# Patient Record
Sex: MALE | HISPANIC OR LATINO | URBAN - METROPOLITAN AREA
[De-identification: names, ages, dates, MRNs, and addresses within clinical notes are randomized per-mention and may not be internally consistent; named-entity substitution may affect disease eponyms.]

---

## 2019-07-06 ENCOUNTER — EMERGENCY (EMERGENCY)
Facility: HOSPITAL | Age: 39
LOS: 1 days | Discharge: HOME | End: 2019-07-06
Attending: EMERGENCY MEDICINE
Payer: COMMERCIAL

## 2019-07-06 VITALS
OXYGEN SATURATION: 98 % | SYSTOLIC BLOOD PRESSURE: 145 MMHG | HEART RATE: 50 BPM | DIASTOLIC BLOOD PRESSURE: 78 MMHG | RESPIRATION RATE: 18 BRPM

## 2019-07-06 VITALS
SYSTOLIC BLOOD PRESSURE: 142 MMHG | TEMPERATURE: 98 F | RESPIRATION RATE: 20 BRPM | HEIGHT: 71 IN | OXYGEN SATURATION: 98 % | HEART RATE: 60 BPM | DIASTOLIC BLOOD PRESSURE: 74 MMHG | WEIGHT: 173.94 LBS

## 2019-07-06 PROCEDURE — 99282 EMERGENCY DEPT VISIT SF MDM: CPT

## 2019-07-06 PROCEDURE — 99283 EMERGENCY DEPT VISIT LOW MDM: CPT

## 2019-07-06 NOTE — ED PROVIDER NOTE - PROGRESS NOTE DETAILS
Pt tolerated po, no nausea/vomiting or abd pain. has improved symptoms, would like to go home, will f.u with pmd tomorrow, refusing zofran for home -Mis Ireland PA-C

## 2019-07-06 NOTE — ED PROVIDER NOTE - PHYSICAL EXAMINATION
A&Ox3, NAD. NCAT. PERRL, EOMI. Neck supple, no LAD. Lungs CTAB. +S1S2, RRR, No m/r/g. Abd soft, NT/ND, +BS, no rebound or guarding, murphys negative. Extremities: cap refill <2, pulses in distal extremities 4+, no edema. Skin without rash. CN II-XII intact. Strength 5/5 UE/LE. Sensations intact throughout. Gait steady.

## 2019-07-06 NOTE — ED PROVIDER NOTE - OBJECTIVE STATEMENT
40 yo male with no pmh, no surgical hx complaining of sudden onset of diffuse abdominal pain/bloating and nausea starting 3-4 hrs ago while grocery shopping. he states pain started suddenly after having protein shake, was very uncomfortable took gas-ex without relief and came for evaluation. He states abd pain and nausea has since mostly resolved. He has been previously feeling well prior to symptom onset, no fevers, chills, vomiting, diarrhea, constipation, sick contacts or recent travel, no dysuria.

## 2019-07-06 NOTE — ED PROVIDER NOTE - NS ED ROS FT
Constitutional: No fever or chills  Eyes: No visual changes, eye pain or redness  HEENT: No throat pain, ear pain, nasal pain. No nose bleeding.  CV: No chest pain or lower extremity edema  Resp: No SOB no cough  GI: see hpi  : No dysuria, hematuria.   MSK: No musculoskeletal pain  Skin: No rash  Neuro: No headache. No numbness or tingling. No weakness.

## 2019-07-06 NOTE — ED PROVIDER NOTE - PLAN OF CARE
- stay hydrated.  - follow up with your pcp in 1-2 days.  - return if symptoms worsen, have worsening abdominal pain, unable to eat or drink, then return to the ED.

## 2019-07-06 NOTE — ED PROVIDER NOTE - CARE PLAN
Principal Discharge DX:	Nausea  Assessment and plan of treatment:	- stay hydrated.  - follow up with your pcp in 1-2 days.  - return if symptoms worsen, have worsening abdominal pain, unable to eat or drink, then return to the ED.

## 2019-07-06 NOTE — ED ADULT NURSE NOTE - NSIMPLEMENTINTERV_GEN_ALL_ED
Implemented All Universal Safety Interventions:  Welcome to call system. Call bell, personal items and telephone within reach. Instruct patient to call for assistance. Room bathroom lighting operational. Non-slip footwear when patient is off stretcher. Physically safe environment: no spills, clutter or unnecessary equipment. Stretcher in lowest position, wheels locked, appropriate side rails in place.

## 2019-07-06 NOTE — ED PROVIDER NOTE - CLINICAL SUMMARY MEDICAL DECISION MAKING FREE TEXT BOX
Patient presenting with abdominal pain and nausea before coming to the ED. Symptoms now resolved. Physical exam unremarkable, no abdominal tenderness. Will discharge home with follow up with primary MD for routine care.  ATTG: Dr. Bush

## 2019-07-06 NOTE — ED ADULT NURSE NOTE - OBJECTIVE STATEMENT
38 y/o male PMH knee and shoulder surgery presents to ED reporting abdominal pain. Pt reports having abdominal pain since having a smoothie this AM. Pt also reports nausea. On exam, AOx3, speaking in complete sentences. Abdomen soft, 38 y/o male PMH knee and shoulder surgery presents to ED reporting abdominal pain. Pt reports having abdominal pain since having a protein shake this AM, pt reports eating this protein shake daily. Pt also reports nausea. On exam, AOx3, speaking in complete sentences. Abdomen soft, tender all 4 quadrants, non-distended, normoactive bowel sounds in all 4 quadrants. Lung sounds CTA, NAD. Pt denies CP, SOB, diarrhea, vomiting and fever/chills at this time. Awaiting evaluation by MD at this time. 40 y/o male PMH knee and shoulder surgery presents to ED reporting abdominal pain. Pt reports having abdominal pain since having a protein shake this AM, pt reports eating this protein shake daily. Pt also reports nausea. On exam, AOx3, speaking in complete sentences. Abdomen soft, non-tender all 4 quadrants, non-distended, normoactive bowel sounds in all 4 quadrants. Lung sounds CTA, NAD. Pt denies CP, SOB, diarrhea, vomiting and fever/chills at this time. Awaiting evaluation by MD at this time.